# Patient Record
Sex: FEMALE | Race: WHITE | NOT HISPANIC OR LATINO
[De-identification: names, ages, dates, MRNs, and addresses within clinical notes are randomized per-mention and may not be internally consistent; named-entity substitution may affect disease eponyms.]

---

## 2021-07-02 ENCOUNTER — TRANSCRIPTION ENCOUNTER (OUTPATIENT)
Age: 26
End: 2021-07-02

## 2022-08-30 PROBLEM — Z00.00 ENCOUNTER FOR PREVENTIVE HEALTH EXAMINATION: Status: ACTIVE | Noted: 2022-08-30

## 2023-04-25 ENCOUNTER — APPOINTMENT (OUTPATIENT)
Dept: ORTHOPEDIC SURGERY | Facility: CLINIC | Age: 28
End: 2023-04-25
Payer: COMMERCIAL

## 2023-04-25 VITALS — WEIGHT: 190 LBS | BODY MASS INDEX: 26.6 KG/M2 | HEIGHT: 71 IN

## 2023-04-25 DIAGNOSIS — Z78.9 OTHER SPECIFIED HEALTH STATUS: ICD-10-CM

## 2023-04-25 PROCEDURE — L1812: CPT | Mod: RT

## 2023-05-02 NOTE — IMAGING
[de-identified] : Constitutional: Healthy, and well nourished in no acute distress. \par Psych: Calm, cooperative, grossly normal \par Eyes: Normal, sclera non-icteric \par Ears, Nose, Mouth, Throat: External inspection of nose and ears does not reveal any scars or masses \par Head: Normocephalic \par Neck: Neck appears supple without sign of limited or painful ROM \par Respiratory: Normal effort, no respiratory distress, no cyanosis \par Cardiovascular: Visualized extremities without edema or varicosities, warm, brisk cap refill \par Abdominal/GI: Not examined \par Skin: No rashes on the extremity examined.\par \par Neurological: Patient is awake and alert  \par \par Knee ROM	\par 	                	                  	\par RIGHT\par EXTENSION: Neutral /\par FLEXION: 130\par 	              	            	\par LEFT	              \par EXTENSION: Neutral\par FLEXION: 130\par \par Exam of the RIGHT   Knee:\par Ecchymosis: NONE \par Soft Tissues No redness, swelling, or localized warmth\par Effusion: NONE\par Tenderness: Tender to palpation at femoral origin of MCL.  No atul joint line tenderness, no pes or upper tibial tenderness.  No tenderness laterally.  Mild discomfort medial patellar facet\par Special tests:\par Ruth's: neg\par Pain with squatting/Duck walk ([place] Test):Not Examined \par Mendez Test: Not Examined\par \par Knee stability:  \par Varus: No Laxity\par Valgus: No Laxity and no atul discomfort\par Lachman and/or Anterior Drawer: Firm End Point         \par Posterior Drawer: NEGATIVE\par Posterolateral corner testing: Not Examined\par 	\par Patella: \par Crepitus: mild crepitus noted\par Inverted-J Sign: None noted\par Patellar apprehension: NEGATIVE \par Patellar grind: Negative \par \par \par \par Patella: Mobility\par \par RIGHT\par QUADRANTS MEDIAL: 1-2 \par QUADRANTS LATERAL: 1-2  \par                     \par LEFT	              \par QUADRANTS MEDIAL: 1-2 \par QUADRANTS LATERAL: 1-2 \par \par \par Strength: Atrophy: Quadriceps tone symmetric\par Patient able to perform a straight leg raise: Yes :No evidence of Extensor Lag \par Squat: deferred\par Pain with resisted strength testing:none with resisted knee extension and flexion; none with resisted SLR\par

## 2023-05-02 NOTE — HISTORY OF PRESENT ILLNESS
[Gradual] : gradual [8] : 8 [3] : 3 [Sharp] : sharp [Constant] : constant [Rest] : rest [Meds] : meds [Ice] : ice [Full time] : Work status: full time [de-identified] : New patient with Right knee pain\par DOI: slow gradual onset one month ago\par Pain with working out- probably squats\par No pop; no overt swelling- just a gradual onset of pain and discomfort\par cant twist easily without it hurting and will get into some positions that provoke pain\par otherwise most ADLs and walking is ok\par now also having pain with prolonged standing and sitting. 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.\par \par brothers wedding in a few days\par \par VB and new kickball league coming up\par \par otherwise healthy and well\par \par Review of Systems: \par Constitutional:  no fever, fatigue or recent weight loss \par HEENT: negative \par CV: negative \par Pulm: negative \par GI: negative \par : negative \par Neuro: negative \par Skin: negative \par Endocrine: negative \par Heme: negative \par MSK: See HPI.\par \par  [] : no [FreeTextEntry1] : rt knee [FreeTextEntry3] : 3 weeks ago [FreeTextEntry5] : no specific injury, felt pain after standing for a long time [de-identified] : from sitting to standing [de-identified] : [employer]

## 2023-05-02 NOTE — DISCUSSION/SUMMARY
[de-identified] : The patient was advised of the diagnosis. The natural history of the pathology was explained in full to the patient  in layman's terms. \par Here is the plan that we have set forth today.\par 1. add in some strengthening with - or could always come to formal PT sessions\par 2. ice as needed\par 3. bracing fore support with athletics- do only what you can tolerate\par 4. follow up in 3 weeks- if still hurting will likely obtain xrays and MRI\par \par The patient understands the plan of care as described above.  All questions have been answered.\par Thank you for allowing me to care for MEGAN .\par Sincerely,\par Nelda Weinstein, , FAAP, CAQ-SM\par Sports Medicine\par \par

## 2023-05-09 ENCOUNTER — APPOINTMENT (OUTPATIENT)
Dept: ORTHOPEDIC SURGERY | Facility: CLINIC | Age: 28
End: 2023-05-09
Payer: COMMERCIAL

## 2023-05-09 VITALS — HEIGHT: 71 IN | WEIGHT: 190 LBS | BODY MASS INDEX: 26.6 KG/M2

## 2023-05-09 DIAGNOSIS — S83.411A SPRAIN OF MEDIAL COLLATERAL LIGAMENT OF RIGHT KNEE, INITIAL ENCOUNTER: ICD-10-CM

## 2023-05-09 DIAGNOSIS — M25.561 PAIN IN RIGHT KNEE: ICD-10-CM

## 2023-05-09 PROCEDURE — 99213 OFFICE O/P EST LOW 20 MIN: CPT

## 2023-05-09 NOTE — HISTORY OF PRESENT ILLNESS
[Gradual] : gradual [5] : 5 [4] : 4 [Frequent] : frequent [Rest] : rest [Standing] : standing [de-identified] : Today Elli returns\par She is feeling largely better.  Has been working with her  and seeing improvements.\par SHe has one small set back - went to brother wedding in [place]- (drank too much) ana bnaged herself up riding a mechanical bull and had some pain and swelling in the right knee therafter.  Attended to the knee, iced often.  Now the pain is simmered down again.\par She doesn’t have her classic pain- twisting motion and leaning onto a flexed knee to get into her bed. that’s gone. Overall she has been really happy with progress to date.\par She is back working with her  as she had a gap around the wedding.\par Also back to [employer] and soon to start VB again.\par \par \par hx from 4/25/23\par New patient with Right knee pain\par DOI: slow gradual onset one month ago\par Pain with working out- probably squats\par No pop; no overt swelling- just a gradual onset of pain and discomfort\par cant twist easily without it hurting and will get into some positions that provoke pain\par otherwise most ADLs and walking is ok\par now also having pain with prolonged standing and sitting. 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

## 2023-05-09 NOTE — IMAGING
[de-identified] : Knee ROM	\par  	 	\par RIGHT\par EXTENSION: Neutral \par FLEXION: 130\par 	 	 	\par LEFT	 \par EXTENSION: Neutral\par FLEXION: 130\par \par Exam of the RIGHT Knee:\par Ecchymosis: NONE \par Soft Tissues No redness or localized warmth, ?mild soft tissue swelling Hoffa's\par Effusion: NONE\par Tenderness: mild tenderness to femoral origin of MCL.  no atul patellar discomfort, no joint line tenderness.\par Special tests:\par Ruth's: neg\par Pain with squatting/Duck walk ([place] Test):Not Examined \par Mendez Test: Not Examined\par \par Knee stability: \par Varus: No Laxity\par Valgus: No Laxity and no atul discomfort\par Lachman and/or Anterior Drawer: Firm End Point \par Posterior Drawer: NEGATIVE\par Posterolateral corner testing: Not Examined\par 	\par Patella: \par Crepitus: mild crepitus noted\par Inverted-J Sign: None noted\par Patellar apprehension: NEGATIVE \par Patellar grind: Negative \par \par \par \par Patella: Mobility\par \par RIGHT\par QUADRANTS MEDIAL: 1-2 \par QUADRANTS LATERAL: 1-2 \par  \par LEFT	 \par QUADRANTS MEDIAL: 1-2 \par QUADRANTS LATERAL: 1-2 \par \par \par Strength: Atrophy: Quadriceps tone symmetric\par Patient able to perform a straight leg raise: Yes :No evidence of Extensor Lag \par Squat: deferred\par Pain with resisted strength testing:none with resisted knee extension and flexion; none with resisted SLR\par  \par

## 2023-05-09 NOTE — DISCUSSION/SUMMARY
[de-identified] : \par The patient was advised of the diagnosis- changes I am seeing in the office today and plans going forward.\par RIGHT patellofemoral pain, mild chronic MCL with improvement overall.\par 1. at this point she is much better\par 2. continue with current personal training sessions and strength work in the gym- maintain that 3 times a week.\par 3. ice post activities if sore.\par 4. check in the next 4 weeks if you develop pain again (as activities progress) or if you have any further swelling.  If you are otherwise pain free and not limited by sport then follow up as needed. if a check in was warranted then we should progress to imaging.\par \par The patient understands the plan of care as described above.  All questions have been answered.\par Thank you for allowing me to care for MEGAN .\par Sincerely,\par Nelda Weinstein, DO, FAAP, CAQ-SM\par Sports Medicine\par \par \par

## 2023-06-21 ENCOUNTER — NON-APPOINTMENT (OUTPATIENT)
Age: 28
End: 2023-06-21

## 2024-04-04 ENCOUNTER — NON-APPOINTMENT (OUTPATIENT)
Age: 29
End: 2024-04-04

## 2024-12-08 ENCOUNTER — NON-APPOINTMENT (OUTPATIENT)
Age: 29
End: 2024-12-08

## 2025-07-21 ENCOUNTER — NON-APPOINTMENT (OUTPATIENT)
Age: 30
End: 2025-07-21